# Patient Record
Sex: FEMALE | URBAN - METROPOLITAN AREA
[De-identification: names, ages, dates, MRNs, and addresses within clinical notes are randomized per-mention and may not be internally consistent; named-entity substitution may affect disease eponyms.]

---

## 2020-05-21 ENCOUNTER — OFFICE VISIT (OUTPATIENT)
Dept: URGENT CARE | Facility: URGENT CARE | Age: 45
End: 2020-05-21
Payer: COMMERCIAL

## 2020-05-21 VITALS
BODY MASS INDEX: 28.76 KG/M2 | TEMPERATURE: 98.7 F | SYSTOLIC BLOOD PRESSURE: 143 MMHG | OXYGEN SATURATION: 100 % | HEART RATE: 90 BPM | RESPIRATION RATE: 20 BRPM | WEIGHT: 194.2 LBS | HEIGHT: 69 IN | DIASTOLIC BLOOD PRESSURE: 91 MMHG

## 2020-05-21 DIAGNOSIS — M54.12 RIGHT CERVICAL RADICULOPATHY: Primary | ICD-10-CM

## 2020-05-21 PROCEDURE — 99203 OFFICE O/P NEW LOW 30 MIN: CPT | Performed by: PHYSICIAN ASSISTANT

## 2020-05-21 RX ORDER — METHOCARBAMOL 500 MG/1
500 TABLET, FILM COATED ORAL 4 TIMES DAILY PRN
Qty: 30 TABLET | Refills: 0 | Status: SHIPPED | OUTPATIENT
Start: 2020-05-21

## 2020-05-21 RX ORDER — PREDNISONE 20 MG/1
20 TABLET ORAL 2 TIMES DAILY
Qty: 10 TABLET | Refills: 0 | Status: SHIPPED | OUTPATIENT
Start: 2020-05-21 | End: 2020-05-26

## 2020-05-21 SDOH — HEALTH STABILITY: MENTAL HEALTH: HOW OFTEN DO YOU HAVE A DRINK CONTAINING ALCOHOL?: NEVER

## 2020-05-21 ASSESSMENT — ENCOUNTER SYMPTOMS
COUGH: 0
FEVER: 0
NECK STIFFNESS: 0
COLOR CHANGE: 0
DIZZINESS: 0
LIGHT-HEADEDNESS: 0
WEAKNESS: 0
MYALGIAS: 1
FACIAL ASYMMETRY: 0
CHILLS: 0
RESPIRATORY NEGATIVE: 1
CONSTITUTIONAL NEGATIVE: 1
PARESTHESIAS: 1
JOINT SWELLING: 0
HEADACHES: 0
TREMORS: 0
FATIGUE: 0
BACK PAIN: 0
PALPITATIONS: 0
SEIZURES: 0
WOUND: 0
NECK PAIN: 1
CARDIOVASCULAR NEGATIVE: 1
ARTHRALGIAS: 1
SHORTNESS OF BREATH: 0
SPEECH DIFFICULTY: 0
WHEEZING: 0

## 2020-05-21 ASSESSMENT — PAIN SCALES - GENERAL: PAINLEVEL: MODERATE PAIN (5)

## 2020-05-21 ASSESSMENT — MIFFLIN-ST. JEOR: SCORE: 1587.07

## 2020-05-22 NOTE — PROGRESS NOTES
Subjective   Jamil Haynes is a 44 year old female who presents to clinic today for the following health issues:  HPI   Neck Pain    Duration: 2days    Description:  Location: across her neck  Radiation: into the RUE    Intensity:  moderate    Accompanying signs and symptoms: Also reports numbness, tingling and weakness.  No swelling, redness, drainage, fevers, HA or URI symptoms.  Thinks that she may have slept the wrong way.  R hand dominant.    History (similar episodes/previous evaluation):  No trauma or injuries.  No heavy lifting.    Precipitating or alleviating factors: None    Therapies tried and outcome: ASA, rest with minimal relief    There is no problem list on file for this patient.    No past surgical history on file.    Social History     Tobacco Use     Smoking status: Never Smoker     Smokeless tobacco: Never Used   Substance Use Topics     Alcohol use: Yes     Frequency: Never     History reviewed. No pertinent family history.      Current Outpatient Medications   Medication Sig Dispense Refill     ASPIRIN PO        No Known Allergies  Reviewed and updated as needed this visit by Provider       Review of Systems   Constitutional: Negative.  Negative for chills, fatigue and fever.   Respiratory: Negative.  Negative for cough, shortness of breath and wheezing.    Cardiovascular: Negative.  Negative for chest pain, palpitations and peripheral edema.   Genitourinary: Negative.    Musculoskeletal: Positive for arthralgias, myalgias and neck pain. Negative for back pain, gait problem, joint swelling and neck stiffness.   Skin: Negative.  Negative for color change, pallor, rash and wound.   Neurological: Positive for paresthesias. Negative for dizziness, tremors, seizures, syncope, facial asymmetry, speech difficulty, weakness, light-headedness and headaches.   All other systems reviewed and are negative.           Objective    BP (!) 143/91 (BP Location: Left arm, Patient Position: Sitting, Cuff Size: Adult  "Large)   Pulse 90   Temp 98.7  F (37.1  C) (Tympanic)   Resp 20   Ht 1.748 m (5' 8.8\")   Wt 88.1 kg (194 lb 3.2 oz)   LMP 05/07/2020 (Approximate)   SpO2 100%   BMI 28.85 kg/m    Body mass index is 28.85 kg/m .  Physical Exam  Vitals signs and nursing note reviewed.   Constitutional:       General: She is not in acute distress.     Appearance: Normal appearance. She is well-developed and normal weight. She is not ill-appearing.   Neck:      Musculoskeletal: Neck supple. Normal range of motion. Muscular tenderness present. No erythema, neck rigidity, injury or spinous process tenderness.      Thyroid: No thyroid mass or thyromegaly.      Trachea: Trachea and phonation normal.      Meningeal: Brudzinski's sign and Kernig's sign absent.   Musculoskeletal:      Right shoulder: She exhibits normal range of motion and no tenderness.      Right elbow: Normal.     Right wrist: Normal.      Right hand: Normal. She exhibits normal range of motion, no tenderness, normal two-point discrimination, normal capillary refill, no deformity, no laceration and no swelling. Normal sensation noted. Normal strength noted.   Lymphadenopathy:      Cervical: No cervical adenopathy.   Skin:     General: Skin is warm and dry.      Capillary Refill: Capillary refill takes less than 2 seconds.      Comments: Distal pulses are 2+ and symmetric.  No peripheral edema.   Neurological:      General: No focal deficit present.      Mental Status: She is alert and oriented to person, place, and time.      Cranial Nerves: Cranial nerves are intact.      Sensory: Sensation is intact. No sensory deficit.      Motor: Motor function is intact.      Gait: Gait is intact. Gait normal.      Deep Tendon Reflexes: Reflexes are normal and symmetric.   Psychiatric:         Mood and Affect: Mood normal.         Behavior: Behavior normal.         Thought Content: Thought content normal.         Judgment: Judgment normal.             Assessment & Plan   Right " cervical radiculopathy:  No trauma or injuries.  H&P is consistent with cervical radiculopathy vs strain/sprain/spasm.  Recommend RICE and will give [id number] and methocarbamol as needed for spasms.  Discussed risks and benefits of medication along with side effects, direction for use.  No driving or operating machinery due to sedation.  Will send to orthopedics if no improvement.  Recheck in clinic if symptoms worsen or if symptoms do not improve.   -     predniSONE (DELTASONE) 20 MG tablet; Take 1 tablet (20 mg) by mouth 2 times daily for 5 days  -     methocarbamol (ROBAXIN) 500 MG tablet; Take 1 tablet (500 mg) by mouth 4 times daily as needed for muscle spasms        Michelle See KELLY Cuevas  [facility]